# Patient Record
Sex: MALE | Race: WHITE | NOT HISPANIC OR LATINO | Employment: OTHER | URBAN - NONMETROPOLITAN AREA
[De-identification: names, ages, dates, MRNs, and addresses within clinical notes are randomized per-mention and may not be internally consistent; named-entity substitution may affect disease eponyms.]

---

## 2021-02-22 ENCOUNTER — IMMUNIZATION (OUTPATIENT)
Dept: VACCINE CLINIC | Facility: HOSPITAL | Age: 81
End: 2021-02-22

## 2021-02-22 PROCEDURE — 91300 HC SARSCOV02 VAC 30MCG/0.3ML IM: CPT | Performed by: INTERNAL MEDICINE

## 2021-02-22 PROCEDURE — 0001A: CPT | Performed by: INTERNAL MEDICINE

## 2021-03-22 ENCOUNTER — IMMUNIZATION (OUTPATIENT)
Dept: VACCINE CLINIC | Facility: HOSPITAL | Age: 81
End: 2021-03-22

## 2021-03-22 PROCEDURE — 0002A: CPT | Performed by: INTERNAL MEDICINE

## 2021-03-22 PROCEDURE — 91300 HC SARSCOV02 VAC 30MCG/0.3ML IM: CPT | Performed by: INTERNAL MEDICINE

## 2023-10-24 ENCOUNTER — OFFICE VISIT (OUTPATIENT)
Dept: CARDIOLOGY | Facility: CLINIC | Age: 83
End: 2023-10-24
Payer: COMMERCIAL

## 2023-10-24 VITALS
HEART RATE: 48 BPM | SYSTOLIC BLOOD PRESSURE: 140 MMHG | HEIGHT: 70 IN | WEIGHT: 158 LBS | BODY MASS INDEX: 22.62 KG/M2 | DIASTOLIC BLOOD PRESSURE: 80 MMHG

## 2023-10-24 DIAGNOSIS — I10 PRIMARY HYPERTENSION: ICD-10-CM

## 2023-10-24 DIAGNOSIS — T50.905A BRADYCARDIA, DRUG INDUCED: ICD-10-CM

## 2023-10-24 DIAGNOSIS — R06.02 SHORTNESS OF BREATH: Primary | ICD-10-CM

## 2023-10-24 DIAGNOSIS — E78.00 HYPERCHOLESTEROLEMIA: ICD-10-CM

## 2023-10-24 DIAGNOSIS — I25.6 SILENT MYOCARDIAL ISCHEMIA: ICD-10-CM

## 2023-10-24 DIAGNOSIS — R25.1 TREMORS OF NERVOUS SYSTEM: ICD-10-CM

## 2023-10-24 DIAGNOSIS — R42 DIZZINESS: ICD-10-CM

## 2023-10-24 DIAGNOSIS — R00.1 BRADYCARDIA, DRUG INDUCED: ICD-10-CM

## 2023-10-24 PROCEDURE — 93000 ELECTROCARDIOGRAM COMPLETE: CPT | Performed by: INTERNAL MEDICINE

## 2023-10-24 PROCEDURE — 99204 OFFICE O/P NEW MOD 45 MIN: CPT | Performed by: INTERNAL MEDICINE

## 2023-10-24 RX ORDER — PRAVASTATIN SODIUM 20 MG
20 TABLET ORAL DAILY
COMMUNITY
Start: 2023-10-04

## 2023-10-24 RX ORDER — ASPIRIN 81 MG/1
81 TABLET ORAL DAILY
Qty: 30 TABLET | Refills: 6 | Status: SHIPPED | OUTPATIENT
Start: 2023-10-24

## 2023-10-24 RX ORDER — PROPRANOLOL HYDROCHLORIDE 40 MG/1
40 TABLET ORAL 3 TIMES DAILY
Status: SHIPPED | COMMUNITY
Start: 2023-10-24

## 2023-10-24 RX ORDER — MULTIPLE VITAMINS W/ MINERALS TAB 9MG-400MCG
1 TAB ORAL DAILY
COMMUNITY

## 2023-10-24 RX ORDER — PROPRANOLOL HYDROCHLORIDE 40 MG/1
40 TABLET ORAL 3 TIMES DAILY
COMMUNITY
End: 2023-10-24

## 2023-10-24 RX ORDER — LOSARTAN POTASSIUM 50 MG/1
50 TABLET ORAL DAILY
Qty: 90 TABLET | Refills: 3 | Status: SHIPPED | OUTPATIENT
Start: 2023-10-24

## 2023-10-24 RX ORDER — LOSARTAN POTASSIUM 25 MG/1
25 TABLET ORAL DAILY
COMMUNITY
Start: 2023-08-12 | End: 2023-10-24

## 2023-10-24 RX ORDER — DOXAZOSIN MESYLATE 4 MG/1
4 TABLET ORAL NIGHTLY
COMMUNITY
Start: 2023-09-28

## 2023-10-24 RX ORDER — TADALAFIL 20 MG/1
20 TABLET ORAL
COMMUNITY

## 2023-10-24 NOTE — PROGRESS NOTES
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 Had Covid about a year ago, symptoms got worse after that.    • Labs     PCP checked in Dec, requesting results.    • Dizziness     Recently has been having some dizziness, worse when he gets up or when he is in the shower. Denies any syncope.    • Cardiac history     none        CARDIAC COMPLAINTS  dyspnea and Dizziness      Subjective   Yusuf Crawford is a 83 y.o. male came in today for his initial cardiac evaluation.  He has history of hypertension, essential tremor and hypercholesterolemia.  He apparently has been having shortness of breath on exertion.  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  He is not able to walk even a short distance without getting short winded.  He denies having any chest pain associated with that.  He denies having any palpitation.  He had COVID about a year ago and the symptoms apparently got worse since then.  He also has been noticing dizziness this happens mostly when he gets up or when he is in the shower.  He never had any syncopal episode.  Regarding his tremors he was put on moderate amount of Inderal and has been taking it since it was started.  He did undergo lab work and found to have a mild anemia with a hemoglobin of 13 and crit of 39.  His electrolytes were normal.  His cholesterol was normal at 168 with the LDL of 81.  I do not have his TSH level.  He has no history of smoking or drinking alcohol.  His father had MI.  Hypertension does run in the family.    History reviewed. No pertinent surgical history.    Current Outpatient  Medications   Medication Sig Dispense Refill   • Calcium Carbonate-Vit D-Min (CALCIUM 1200 PO) Take  by mouth.     • doxazosin (CARDURA) 4 MG tablet Take 1 tablet by mouth Every Night.     • enzalutamide (Xtandi) 40 MG chemo capsule Take 4 capsules by mouth Daily.     • multivitamin with minerals tablet tablet Take 1 tablet by mouth Daily.     • pravastatin (PRAVACHOL) 20 MG tablet Take 1 tablet by mouth Daily.     • propranolol (INDERAL) 40 MG tablet Take 1 tablet by mouth 3 (Three) Times a Day. Reduce to 1/2 tab BID     • tadalafil (ADCIRCA) 20 MG tablet tablet Take 1 tablet by mouth Daily.     • aspirin 81 MG EC tablet Take 1 tablet by mouth Daily. 30 tablet 6   • losartan (Cozaar) 50 MG tablet Take 1 tablet by mouth Daily. 90 tablet 3     No current facility-administered medications for this visit.           ALLERGIES:  Patient has no known allergies.    Past Medical History:   Diagnosis Date   • Colon cancer    • History of elbow surgery    • History of partial colectomy     2008   • History of transurethral resection of prostate    • Hyperlipidemia    • Hypertension    • Prostate cancer        Social History     Tobacco Use   Smoking Status Never   Smokeless Tobacco Never          Family History   Problem Relation Age of Onset   • Cancer Mother    • Dementia Father    • Heart attack Father         Multiple MI's in his 50's   • Dementia Brother    • Other Other         medical history unknown   • Hypertension Daughter    • Hypertension Daughter    • Hypertension Son    • Hypertension Son        Review of Systems   Constitutional: Positive for malaise/fatigue. Negative for decreased appetite.   HENT:  Negative for congestion and sore throat.    Eyes:  Negative for blurred vision, double vision and visual disturbance.   Cardiovascular:  Positive for dyspnea on exertion. Negative for chest pain.   Respiratory:  Positive for shortness of breath. Negative for snoring.    Endocrine: Negative for cold intolerance and  "heat intolerance.   Hematologic/Lymphatic: Negative for adenopathy. Does not bruise/bleed easily.   Skin:  Negative for itching, nail changes and skin cancer.   Musculoskeletal:  Negative for arthritis and myalgias.   Gastrointestinal:  Negative for abdominal pain, dysphagia and heartburn.   Genitourinary:  Negative for bladder incontinence and frequency.   Neurological:  Negative for dizziness, seizures and vertigo.   Psychiatric/Behavioral:  Negative for altered mental status.    Allergic/Immunologic: Negative for environmental allergies and hives.     Diabetes- No  Thyroid- normal    Objective     /80 (BP Location: Left arm)   Pulse (!) 48   Ht 177.8 cm (70\")   Wt 71.7 kg (158 lb)   BMI 22.67 kg/m²     Vitals and nursing note reviewed.   Constitutional:       Appearance: Healthy appearance. Not in distress.   Eyes:      Conjunctiva/sclera: Conjunctivae normal.      Pupils: Pupils are equal, round, and reactive to light.   HENT:      Head: Normocephalic.   Pulmonary:      Effort: Pulmonary effort is normal.      Breath sounds: Normal breath sounds.   Cardiovascular:      PMI at left midclavicular line. Bradycardia present. Regular rhythm.      Murmurs: There is a grade 3/6 high frequency blowing holosystolic murmur at the apex.   Abdominal:      General: Bowel sounds are normal.      Palpations: Abdomen is soft.   Musculoskeletal: Normal range of motion.      Cervical back: Normal range of motion and neck supple. Skin:     General: Skin is warm and dry.   Neurological:      Mental Status: Alert, oriented to person, place, and time and oriented to person, place and time.       ECG 12 Lead    Date/Time: 10/24/2023 1:11 PM  Performed by: Rossy Duff MD    Authorized by: Rossy Duff MD  Comparison: compared with previous ECG from 6/6/2022  Similar to previous ECG  Rhythm: sinus bradycardia  Rate: bradycardic  QRS axis: normal  Other findings: non-specific ST-T wave changes    Clinical " impression: non-specific ECG        @ASSESSMENT/PLAN@  BMI is within normal parameters. No other follow-up for BMI required.     Diagnoses and all orders for this visit:    1. Shortness of breath (Primary)  -     Stress Test With Myocardial Perfusion One Day; Future  -     Adult Transthoracic Echo Complete W/ Cont if Necessary Per Protocol; Future    2. Bradycardia, drug induced    3. Primary hypertension  -     losartan (Cozaar) 50 MG tablet; Take 1 tablet by mouth Daily.  Dispense: 90 tablet; Refill: 3    4. Tremors of nervous system    5. Dizziness  -     Adult Transthoracic Echo Complete W/ Cont if Necessary Per Protocol; Future    6. Hypercholesterolemia    7. Silent myocardial ischemia  -     aspirin 81 MG EC tablet; Take 1 tablet by mouth Daily.  Dispense: 30 tablet; Refill: 6  -     Stress Test With Myocardial Perfusion One Day; Future    At baseline is bradycardic.  His blood pressure is slightly elevated.  His EKG showed a lot of artifact.  The first EKG was read as atrial flutter though it appears to be secondary to his tremors repeat EKG shows sinus bradycardia with nonspecific T wave changes.  His clinical examination reveals a BMI of 23.  He does have significant tremors.    Regarding his major problem of shortness of breath, it apparently started after COVID.  I talked with him about possibility of a cardiomyopathy from COVID itself.  I scheduled him to undergo an echocardiogram to evaluate for LV function, valvular structures and the PA pressure.  I also talked to him about the possibility of silent myocardial ischemia.  I did advise him to be on aspirin 81 mg once a day and schedule him to undergo a stress test in the form of Lexiscan to rule out ischemia.    Regarding his bradycardia, it could be secondary to the high-dose of Inderal he is taking.  He was on 40 mg 3 times a day.  I advised him to reduce it to 20 mg twice a day.  If the heart rate still remains low then we may need to taper and stop  the medication.  At this time he is not a candidate for pacemaker.    Regarding his hypertension which is still slightly elevated, I increased the dose of Cozaar to 50 mg once a day.  Advised him to check his blood pressure regularly.  If it persistently high then we may have to increase it to 100 mg    Regarding his tremors, apparently he was told this is essential tremor.  If he is not able to take the Inderal and if the tremors gets worse, he may need to see a neurologist to try different medication    Regarding his dizziness which could be related to bradycardia I like to rule out valvular heart disease also.  Like to evaluate his valvular structures by an echocardiogram    Regarding his hypercholesterolemia, it seems to be controlled with Pravachol itself.  Continue the same    Regarding advanced directive, he does have a living will and power of .  I talked to him about it and gave him booklets regarding that  Based on the results, further recommendations will be made                 Electronically signed by Rossy Duff MD October 24, 2023 12:58 EDT

## 2023-10-24 NOTE — LETTER
October 24, 2023       No Recipients    Patient: Yusuf Crawford   YOB: 1940   Date of Visit: 10/24/2023     Dear Yoandy You DO:       Thank you for referring Yusuf Crawford to me for evaluation. Below are the relevant portions of my assessment and plan of care.    If you have questions, please do not hesitate to call me. I look forward to following Yusuf along with you.         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 Had Covid about a year ago, symptoms got worse after that.    • Labs     PCP checked in Dec, requesting results.    • Dizziness     Recently has been having some dizziness, worse when he gets up or when he is in the shower. Denies any syncope.    • Cardiac history     none        CARDIAC COMPLAINTS  dyspnea and Dizziness      Subjective  Yusuf Crawford is a 83 y.o. male came in today for his initial cardiac evaluation.  He has history of hypertension, essential tremor and hypercholesterolemia.  He apparently has been having shortness of breath on exertion.  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  He is not able to walk even a short distance without getting short winded.  He denies having any chest pain associated with that.  He denies having any palpitation.  He had COVID about a year ago and the symptoms apparently got worse since then.  He also has been noticing dizziness this happens mostly when he gets up or when he is in the shower.  He  never had any syncopal episode.  Regarding his tremors he was put on moderate amount of Inderal and has been taking it since it was started.  He did undergo lab work and found to have a mild anemia with a hemoglobin of 13 and crit of 39.  His electrolytes were normal.  His cholesterol was normal at 168 with the LDL of 81.  I do not have his TSH level.  He has no history of smoking or drinking alcohol.  His father had MI.  Hypertension does run in the family.    History reviewed. No pertinent surgical history.    Current Outpatient Medications   Medication Sig Dispense Refill   • Calcium Carbonate-Vit D-Min (CALCIUM 1200 PO) Take  by mouth.     • doxazosin (CARDURA) 4 MG tablet Take 1 tablet by mouth Every Night.     • enzalutamide (Xtandi) 40 MG chemo capsule Take 4 capsules by mouth Daily.     • multivitamin with minerals tablet tablet Take 1 tablet by mouth Daily.     • pravastatin (PRAVACHOL) 20 MG tablet Take 1 tablet by mouth Daily.     • propranolol (INDERAL) 40 MG tablet Take 1 tablet by mouth 3 (Three) Times a Day. Reduce to 1/2 tab BID     • tadalafil (ADCIRCA) 20 MG tablet tablet Take 1 tablet by mouth Daily.     • aspirin 81 MG EC tablet Take 1 tablet by mouth Daily. 30 tablet 6   • losartan (Cozaar) 50 MG tablet Take 1 tablet by mouth Daily. 90 tablet 3     No current facility-administered medications for this visit.           ALLERGIES:  Patient has no known allergies.    Past Medical History:   Diagnosis Date   • Colon cancer    • History of elbow surgery    • History of partial colectomy     2008   • History of transurethral resection of prostate    • Hyperlipidemia    • Hypertension    • Prostate cancer        Social History     Tobacco Use   Smoking Status Never   Smokeless Tobacco Never          Family History   Problem Relation Age of Onset   • Cancer Mother    • Dementia Father    • Heart attack Father         Multiple MI's in his 50's   • Dementia Brother    • Other Other         medical history  "unknown   • Hypertension Daughter    • Hypertension Daughter    • Hypertension Son    • Hypertension Son        Review of Systems   Constitutional: Positive for malaise/fatigue. Negative for decreased appetite.   HENT:  Negative for congestion and sore throat.    Eyes:  Negative for blurred vision, double vision and visual disturbance.   Cardiovascular:  Positive for dyspnea on exertion. Negative for chest pain.   Respiratory:  Positive for shortness of breath. Negative for snoring.    Endocrine: Negative for cold intolerance and heat intolerance.   Hematologic/Lymphatic: Negative for adenopathy. Does not bruise/bleed easily.   Skin:  Negative for itching, nail changes and skin cancer.   Musculoskeletal:  Negative for arthritis and myalgias.   Gastrointestinal:  Negative for abdominal pain, dysphagia and heartburn.   Genitourinary:  Negative for bladder incontinence and frequency.   Neurological:  Negative for dizziness, seizures and vertigo.   Psychiatric/Behavioral:  Negative for altered mental status.    Allergic/Immunologic: Negative for environmental allergies and hives.     Diabetes- No  Thyroid- normal    Objective    /80 (BP Location: Left arm)   Pulse (!) 48   Ht 177.8 cm (70\")   Wt 71.7 kg (158 lb)   BMI 22.67 kg/m²     Vitals and nursing note reviewed.   Constitutional:       Appearance: Healthy appearance. Not in distress.   Eyes:      Conjunctiva/sclera: Conjunctivae normal.      Pupils: Pupils are equal, round, and reactive to light.   HENT:      Head: Normocephalic.   Pulmonary:      Effort: Pulmonary effort is normal.      Breath sounds: Normal breath sounds.   Cardiovascular:      PMI at left midclavicular line. Bradycardia present. Regular rhythm.      Murmurs: There is a grade 3/6 high frequency blowing holosystolic murmur at the apex.   Abdominal:      General: Bowel sounds are normal.      Palpations: Abdomen is soft.   Musculoskeletal: Normal range of motion.      Cervical back: " Normal range of motion and neck supple. Skin:     General: Skin is warm and dry.   Neurological:      Mental Status: Alert, oriented to person, place, and time and oriented to person, place and time.       ECG 12 Lead    Date/Time: 10/24/2023 1:11 PM  Performed by: Rossy Duff MD    Authorized by: Rossy Duff MD  Comparison: compared with previous ECG from 6/6/2022  Similar to previous ECG  Rhythm: sinus bradycardia  Rate: bradycardic  QRS axis: normal  Other findings: non-specific ST-T wave changes    Clinical impression: non-specific ECG        @ASSESSMENT/PLAN@  BMI is within normal parameters. No other follow-up for BMI required.     Diagnoses and all orders for this visit:    1. Shortness of breath (Primary)  -     Stress Test With Myocardial Perfusion One Day; Future  -     Adult Transthoracic Echo Complete W/ Cont if Necessary Per Protocol; Future    2. Bradycardia, drug induced    3. Primary hypertension  -     losartan (Cozaar) 50 MG tablet; Take 1 tablet by mouth Daily.  Dispense: 90 tablet; Refill: 3    4. Tremors of nervous system    5. Dizziness  -     Adult Transthoracic Echo Complete W/ Cont if Necessary Per Protocol; Future    6. Hypercholesterolemia    7. Silent myocardial ischemia  -     aspirin 81 MG EC tablet; Take 1 tablet by mouth Daily.  Dispense: 30 tablet; Refill: 6  -     Stress Test With Myocardial Perfusion One Day; Future    At baseline is bradycardic.  His blood pressure is slightly elevated.  His EKG showed a lot of artifact.  The first EKG was read as atrial flutter though it appears to be secondary to his tremors repeat EKG shows sinus bradycardia with nonspecific T wave changes.  His clinical examination reveals a BMI of 23.  He does have significant tremors.    Regarding his major problem of shortness of breath, it apparently started after COVID.  I talked with him about possibility of a cardiomyopathy from COVID itself.  I scheduled him to undergo an  echocardiogram to evaluate for LV function, valvular structures and the PA pressure.  I also talked to him about the possibility of silent myocardial ischemia.  I did advise him to be on aspirin 81 mg once a day and schedule him to undergo a stress test in the form of Lexiscan to rule out ischemia.    Regarding his bradycardia, it could be secondary to the high-dose of Inderal he is taking.  He was on 40 mg 3 times a day.  I advised him to reduce it to 20 mg twice a day.  If the heart rate still remains low then we may need to taper and stop the medication.  At this time he is not a candidate for pacemaker.    Regarding his hypertension which is still slightly elevated, I increased the dose of Cozaar to 50 mg once a day.  Advised him to check his blood pressure regularly.  If it persistently high then we may have to increase it to 100 mg    Regarding his tremors, apparently he was told this is essential tremor.  If he is not able to take the Inderal and if the tremors gets worse, he may need to see a neurologist to try different medication    Regarding his dizziness which could be related to bradycardia I like to rule out valvular heart disease also.  Like to evaluate his valvular structures by an echocardiogram    Regarding his hypercholesterolemia, it seems to be controlled with Pravachol itself.  Continue the same    Regarding advanced directive, he does have a living will and power of .  I talked to him about it and gave him booklets regarding that  Based on the results, further recommendations will be made                 Electronically signed by Rossy Duff MD October 24, 2023 12:58 EDT

## 2023-11-06 ENCOUNTER — HOSPITAL ENCOUNTER (OUTPATIENT)
Dept: CARDIOLOGY | Facility: HOSPITAL | Age: 83
Discharge: HOME | End: 2023-11-06
Payer: MEDICARE

## 2023-11-06 VITALS — HEIGHT: 70 IN | BODY MASS INDEX: 22.63 KG/M2 | WEIGHT: 158.07 LBS

## 2023-11-06 DIAGNOSIS — R42 DIZZINESS: ICD-10-CM

## 2023-11-06 DIAGNOSIS — R06.02 SHORTNESS OF BREATH: ICD-10-CM

## 2023-11-06 DIAGNOSIS — I25.6 SILENT MYOCARDIAL ISCHEMIA: ICD-10-CM

## 2023-11-06 LAB
AORTIC DIMENSIONLESS INDEX: 0.91 (DI)
BH CV ECHO MEAS - ACS: 1.43 CM
BH CV ECHO MEAS - AO MAX PG: 4.1 MMHG
BH CV ECHO MEAS - AO MEAN PG: 2.25 MMHG
BH CV ECHO MEAS - AO ROOT DIAM: 3 CM
BH CV ECHO MEAS - AO V2 MAX: 101.4 CM/SEC
BH CV ECHO MEAS - AO V2 VTI: 25.8 CM
BH CV ECHO MEAS - EDV(CUBED): 129.6 ML
BH CV ECHO MEAS - EF(MOD-BP): 58 %
BH CV ECHO MEAS - ESV(CUBED): 42.6 ML
BH CV ECHO MEAS - FS: 31 %
BH CV ECHO MEAS - IVS/LVPW: 1.02 CM
BH CV ECHO MEAS - IVSD: 0.99 CM
BH CV ECHO MEAS - LA DIMENSION: 3.8 CM
BH CV ECHO MEAS - LAT PEAK E' VEL: 7 CM/SEC
BH CV ECHO MEAS - LV MASS(C)D: 181.1 GRAMS
BH CV ECHO MEAS - LV MAX PG: 3.4 MMHG
BH CV ECHO MEAS - LV MEAN PG: 1.64 MMHG
BH CV ECHO MEAS - LV V1 MAX: 92.7 CM/SEC
BH CV ECHO MEAS - LV V1 VTI: 26.3 CM
BH CV ECHO MEAS - LVIDD: 5.1 CM
BH CV ECHO MEAS - LVIDS: 3.5 CM
BH CV ECHO MEAS - LVPWD: 0.97 CM
BH CV ECHO MEAS - MED PEAK E' VEL: 8.4 CM/SEC
BH CV ECHO MEAS - MV A MAX VEL: 53.4 CM/SEC
BH CV ECHO MEAS - MV DEC SLOPE: 273.6 CM/SEC2
BH CV ECHO MEAS - MV DEC TIME: 0.35 SEC
BH CV ECHO MEAS - MV E MAX VEL: 47.9 CM/SEC
BH CV ECHO MEAS - MV E/A: 0.9
BH CV ECHO MEAS - MV MAX PG: 1.78 MMHG
BH CV ECHO MEAS - MV MEAN PG: 0.69 MMHG
BH CV ECHO MEAS - MV P1/2T: 81.7 MSEC
BH CV ECHO MEAS - MV V2 VTI: 25 CM
BH CV ECHO MEAS - MVA(P1/2T): 2.7 CM2
BH CV ECHO MEAS - PA V2 MAX: 92.7 CM/SEC
BH CV ECHO MEAS - RAP SYSTOLE: 8 MMHG
BH CV ECHO MEAS - RV MAX PG: 1.87 MMHG
BH CV ECHO MEAS - RV V1 MAX: 68.3 CM/SEC
BH CV ECHO MEAS - RV V1 VTI: 19.7 CM
BH CV ECHO MEAS - RVSP: 26.4 MMHG
BH CV ECHO MEAS - TAPSE (>1.6): 2.5 CM
BH CV ECHO MEAS - TR MAX PG: 18.4 MMHG
BH CV ECHO MEAS - TR MAX VEL: 214.5 CM/SEC
BH CV ECHO MEASUREMENTS AVERAGE E/E' RATIO: 6.22
BH CV REST NUCLEAR ISOTOPE DOSE: 10 MCI
BH CV STRESS COMMENTS STAGE 1: NORMAL
BH CV STRESS DOSE REGADENOSON STAGE 1: 0.4
BH CV STRESS DURATION MIN STAGE 1: 0
BH CV STRESS DURATION SEC STAGE 1: 10
BH CV STRESS NUCLEAR ISOTOPE DOSE: 30 MCI
BH CV STRESS PROTOCOL 1: NORMAL
BH CV STRESS RECOVERY HR: 65 BPM
BH CV STRESS STAGE 1: 1
BH CV XLRA - TDI S': 12.9 CM/SEC
LV EF NUC BP: 58 %
MAXIMAL PREDICTED HEART RATE: 136 BPM
PERCENT MAX PREDICTED HR: 49.26 %
SINUS: 2.9 CM
STRESS BASELINE BP: NORMAL MMHG
STRESS BASELINE HR: 59 BPM
STRESS PERCENT HR: 58 %
STRESS POST PEAK BP: NORMAL MMHG
STRESS POST PEAK HR: 67 BPM
STRESS TARGET HR: 116 BPM

## 2023-11-06 PROCEDURE — 78452 HT MUSCLE IMAGE SPECT MULT: CPT

## 2023-11-06 PROCEDURE — 93306 TTE W/DOPPLER COMPLETE: CPT

## 2023-11-06 PROCEDURE — 93306 TTE W/DOPPLER COMPLETE: CPT | Performed by: INTERNAL MEDICINE

## 2023-11-06 PROCEDURE — A9500 TC99M SESTAMIBI: HCPCS | Performed by: INTERNAL MEDICINE

## 2023-11-06 PROCEDURE — 0 TECHNETIUM SESTAMIBI: Performed by: INTERNAL MEDICINE

## 2023-11-06 PROCEDURE — 93018 CV STRESS TEST I&R ONLY: CPT | Performed by: INTERNAL MEDICINE

## 2023-11-06 PROCEDURE — 78452 HT MUSCLE IMAGE SPECT MULT: CPT | Performed by: INTERNAL MEDICINE

## 2023-11-06 PROCEDURE — 93017 CV STRESS TEST TRACING ONLY: CPT

## 2023-11-06 PROCEDURE — 25010000002 REGADENOSON 0.4 MG/5ML SOLUTION: Performed by: INTERNAL MEDICINE

## 2023-11-06 RX ORDER — REGADENOSON 0.08 MG/ML
0.4 INJECTION, SOLUTION INTRAVENOUS
Status: COMPLETED | OUTPATIENT
Start: 2023-11-06 | End: 2023-11-06

## 2023-11-06 RX ADMIN — REGADENOSON 0.4 MG: 0.08 INJECTION, SOLUTION INTRAVENOUS at 13:32

## 2023-11-06 RX ADMIN — TECHNETIUM TC 99M SESTAMIBI 1 DOSE: 1 INJECTION INTRAVENOUS at 13:32

## 2023-11-06 RX ADMIN — TECHNETIUM TC 99M SESTAMIBI 1 DOSE: 1 INJECTION INTRAVENOUS at 12:34

## 2023-11-08 ENCOUNTER — TELEPHONE (OUTPATIENT)
Dept: CARDIOLOGY | Facility: CLINIC | Age: 83
End: 2023-11-08
Payer: MEDICARE

## 2023-11-08 RX ORDER — LOSARTAN POTASSIUM AND HYDROCHLOROTHIAZIDE 25; 100 MG/1; MG/1
1 TABLET ORAL DAILY
Qty: 90 TABLET | Refills: 3 | Status: SHIPPED | OUTPATIENT
Start: 2023-11-08

## 2023-11-08 NOTE — TELEPHONE ENCOUNTER
I spoke with patient and wife, Cristopher.  Aware of stress test results and recommendations to stop losartan and start Hyzaar 100/25 mg once a day and if symptoms persist to call our office may need elective cath for definite diagnosis.  Patient and wife verbalized understanding.

## 2023-11-08 NOTE — TELEPHONE ENCOUNTER
----- Message from Rossy Duff MD sent at 11/7/2023  6:48 AM EST -----  Cozaar to Hyzaar  Cath if he has more symptoms

## 2023-11-16 ENCOUNTER — TELEPHONE (OUTPATIENT)
Dept: CARDIOLOGY | Facility: CLINIC | Age: 83
End: 2023-11-16
Payer: MEDICARE

## 2023-11-16 NOTE — TELEPHONE ENCOUNTER
Patient and his daughter Kaylee called regarding patient having problems with dizzy spells.  He says he notices more after he has exerts himself and sits down to rest and then when he gets up he has the dizzy spells.  He checked BP this afternoon and reports it was 136/69 HR 80's.  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  He drinks 2- 16 oz bottles of water a day.  He reports he has not had any low BP readings that he is aware of.  After stress test his losartan 50 mg was changed to Hyzaar 100/25 mg daily. He is also taking his propranolol 40 mg 1/2 tablet BID.  He denies any chest pain or palpitations. He said his SOA is when he does moderate exertion, but states that is his normal.

## 2023-11-16 NOTE — TELEPHONE ENCOUNTER
Patient denies any palpitation or profuse diaphoresis with dizziness.  Patient's wife asked if his Xtandi 40 mg 4 caps daily could be causing problems with his other medications.  I advised I am not sure, but she said he has been on it for about 1 1/2 years..

## 2023-11-16 NOTE — TELEPHONE ENCOUNTER
Any palpitation associated with the dizziness?  He may need a Holter if he has palpitation also  If he also has profuse diaphoresis with the dizziness then he may need to undergo cardiac catheterization for silent ischemia

## 2024-02-20 RX ORDER — LOSARTAN POTASSIUM AND HYDROCHLOROTHIAZIDE 25; 100 MG/1; MG/1
1 TABLET ORAL DAILY
Qty: 90 TABLET | Refills: 3 | Status: SHIPPED | OUTPATIENT
Start: 2024-02-20

## 2024-02-20 NOTE — TELEPHONE ENCOUNTER
Caller: [facility] [place] - [place], [place] - [address] - [phone] [facility] [phone] FX    Relationship: Pharmacy    Requested Prescriptions:   Requested Prescriptions     Pending Prescriptions Disp Refills    losartan-hydrochlorothiazide (Hyzaar) 100-25 MG per tablet 90 tablet 3     Sig: Take 1 tablet by mouth Daily. STOP LOSARTAN        Pharmacy where request should be sent: [facility] [place] - [place] - [address] - [phone] [facility] [phone] FX     Last office visit with prescribing clinician: 10/24/2023   Last telemedicine visit with prescribing clinician: Visit date not found   Next office visit with prescribing clinician: Visit date not found       Dirk Pavon Rep   02/20/24 13:51 EST

## 2024-05-01 ENCOUNTER — OFFICE VISIT (OUTPATIENT)
Dept: CARDIOLOGY | Facility: CLINIC | Age: 84
End: 2024-05-01
Payer: MEDICARE

## 2024-05-01 VITALS
WEIGHT: 145.4 LBS | SYSTOLIC BLOOD PRESSURE: 118 MMHG | HEART RATE: 64 BPM | BODY MASS INDEX: 20.81 KG/M2 | DIASTOLIC BLOOD PRESSURE: 60 MMHG | HEIGHT: 70 IN

## 2024-05-01 DIAGNOSIS — E78.00 HYPERCHOLESTEROLEMIA: ICD-10-CM

## 2024-05-01 DIAGNOSIS — I10 PRIMARY HYPERTENSION: ICD-10-CM

## 2024-05-01 DIAGNOSIS — R94.39 ABNORMAL NUCLEAR STRESS TEST: Primary | ICD-10-CM

## 2024-05-01 DIAGNOSIS — R25.1 TREMORS OF NERVOUS SYSTEM: ICD-10-CM

## 2024-05-01 DIAGNOSIS — R42 DIZZINESS: ICD-10-CM

## 2024-05-01 DIAGNOSIS — R06.02 SHORTNESS OF BREATH: ICD-10-CM

## 2024-05-01 PROCEDURE — 1159F MED LIST DOCD IN RCRD: CPT | Performed by: NURSE PRACTITIONER

## 2024-05-01 PROCEDURE — 3074F SYST BP LT 130 MM HG: CPT | Performed by: NURSE PRACTITIONER

## 2024-05-01 PROCEDURE — 1160F RVW MEDS BY RX/DR IN RCRD: CPT | Performed by: NURSE PRACTITIONER

## 2024-05-01 PROCEDURE — 3078F DIAST BP <80 MM HG: CPT | Performed by: NURSE PRACTITIONER

## 2024-05-01 PROCEDURE — 99214 OFFICE O/P EST MOD 30 MIN: CPT | Performed by: NURSE PRACTITIONER

## 2024-05-01 RX ORDER — OMEPRAZOLE 20 MG/1
20 CAPSULE, DELAYED RELEASE ORAL 2 TIMES DAILY
COMMUNITY

## 2024-05-01 RX ORDER — LOSARTAN POTASSIUM 50 MG/1
50 TABLET ORAL DAILY
Qty: 90 TABLET | Refills: 2 | Status: SHIPPED | OUTPATIENT
Start: 2024-05-01

## 2024-05-01 RX ORDER — CLONAZEPAM 0.5 MG/1
0.5 TABLET ORAL 2 TIMES DAILY PRN
COMMUNITY

## 2024-05-01 RX ORDER — PROPRANOLOL HYDROCHLORIDE 20 MG/1
20 TABLET ORAL 2 TIMES DAILY
Qty: 180 TABLET | Refills: 2 | Status: SHIPPED | OUTPATIENT
Start: 2024-05-01

## 2024-05-01 RX ORDER — PROPRANOLOL HYDROCHLORIDE 20 MG/1
20 TABLET ORAL 2 TIMES DAILY
Qty: 180 TABLET | Refills: 2 | Status: SHIPPED | OUTPATIENT
Start: 2024-05-01 | End: 2024-05-01 | Stop reason: SDUPTHER

## 2024-05-01 NOTE — PROGRESS NOTES
Chief Complaint   Patient presents with    Follow-up     Cardiac management    Dizziness     Reports he has episodes of dizziness, is worse  when he gets up in morning.  He is trying to hydrate and eat  better  . He had a couple falls due to dizziness .  Reports he gets fatigued very quickly , any exertion exhausts him     Hypotension     Has orthostatic hypotension at times.     Med Refill     No refills needed today       Subjective       Yusuf Crawford is a 84 y.o. male initially seen October 2023 for cardiac consultation.  He has HTN, essential tremor, and hypercholesterolemia.  His symptoms included shortness of breath.  Symptoms worsen after having COVID.    On 11/6/2023 Lexiscan stress test showed increased BP response and ischemia versus versus diaphragmatic attenuation.  Cozaar changed to Hyzaar.  Plan to proceed with cardiac catheterization for definitive doses if symptoms persisted.  Echocardiogram showed borderline LVH with EF around 60% and no significant valve issues.    Today returns the office for follow-up visit.  He continues to have decreased appetite but is maintaining weight.  He is trying to maintain better water intake but continues to have some episodes of lightheadedness, near syncope or syncopal episodes denied.  He denies chest pain, palpitations, edema or increase shortness of breath.       Cardiac History:    Past Surgical History:   Procedure Laterality Date    CARDIOVASCULAR STRESS TEST  11/06/2023    Lexiscan. EF 58%. 202/92. PVC. R/O Inferoseptal Ischemia    ECHO - CONVERTED  11/06/2023    EF 60%. LA- 3.8. Mild MR. RVSP- 27 mmHg       Current Outpatient Medications   Medication Sig Dispense Refill    aspirin 81 MG EC tablet Take 1 tablet by mouth Daily. 30 tablet 6    Calcium Carbonate-Vit D-Min (CALCIUM 1200 PO) Take  by mouth.      clonazePAM (KlonoPIN) 0.5 MG tablet Take 1 tablet by mouth 2 (Two) Times a Day As Needed. 1/2 tablet twice daily      doxazosin (CARDURA) 4 MG tablet  Take 1 tablet by mouth Every Night.      enzalutamide (Xtandi) 40 MG chemo capsule Take 4 capsules by mouth Daily.      multivitamin with minerals tablet tablet Take 1 tablet by mouth Daily.      omeprazole (priLOSEC) 20 MG capsule Take 1 capsule by mouth 2 (Two) Times a Day.      pravastatin (PRAVACHOL) 20 MG tablet Take 1 tablet by mouth Daily.      propranolol (INDERAL) 20 MG tablet Take 1 tablet by mouth 2 (Two) Times a Day. 180 tablet 2    losartan (Cozaar) 50 MG tablet Take 1 tablet by mouth Daily. 90 tablet 2     No current facility-administered medications for this visit.       Patient has no known allergies.    Past Medical History:   Diagnosis Date    Colon cancer     History of elbow surgery     History of partial colectomy     2008    History of transurethral resection of prostate     Hyperlipidemia     Hypertension     Prostate cancer        Social History     Socioeconomic History    Marital status:    Tobacco Use    Smoking status: Never    Smokeless tobacco: Never   Vaping Use    Vaping status: Never Used   Substance and Sexual Activity    Alcohol use: Never    Drug use: Never       Family History   Problem Relation Age of Onset    Cancer Mother     Dementia Father     Heart attack Father         Multiple MI's in his 50's    Dementia Brother     Other Other         medical history unknown    Hypertension Daughter     Hypertension Daughter     Hypertension Son     Hypertension Son        Review of Systems   Constitutional: Positive for decreased appetite. Negative for weight gain and weight loss.   Cardiovascular:  Negative for chest pain, leg swelling, near-syncope and palpitations.   Respiratory:  Negative for shortness of breath.    Hematologic/Lymphatic: Negative for bleeding problem.   Neurological:  Positive for dizziness and light-headedness.        BP Readings from Last 5 Encounters:   05/01/24 118/60   10/24/23 140/80       Wt Readings from Last 5 Encounters:   05/01/24 66 kg (145 lb  "6.4 oz)   11/06/23 71.7 kg (158 lb 1.1 oz)   10/24/23 71.7 kg (158 lb)       Objective     /60 (BP Location: Left arm, Patient Position: Standing)   Pulse 64   Ht 177.8 cm (70\")   Wt 66 kg (145 lb 6.4 oz)   BMI 20.86 kg/m²     Vitals and nursing note reviewed.   Constitutional:       Appearance: Healthy appearance. Not in distress.   Eyes:      Conjunctiva/sclera: Conjunctivae normal.      Pupils: Pupils are equal, round, and reactive to light.   HENT:      Head: Normocephalic.   Pulmonary:      Effort: Pulmonary effort is normal.      Breath sounds: Normal breath sounds.   Cardiovascular:      PMI at left midclavicular line. Normal rate. Regular rhythm.   Pulses:     Intact distal pulses.   Edema:     Peripheral edema absent.   Abdominal:      General: Bowel sounds are normal.      Palpations: Abdomen is soft.   Musculoskeletal: Normal range of motion.      Cervical back: Normal range of motion and neck supple. Skin:     General: Skin is warm and dry.   Neurological:      Mental Status: Alert, oriented to person, place, and time and oriented to person, place and time.          Procedures: none today          Assessment & Plan   Diagnoses and all orders for this visit:    1. Abnormal nuclear stress test (Primary)    2. Primary hypertension  -     losartan (Cozaar) 50 MG tablet; Take 1 tablet by mouth Daily.  Dispense: 90 tablet; Refill: 2    3. Hypercholesterolemia    4. Shortness of breath    5. Dizziness  -     US Carotid Bilateral; Future    6. Tremors of nervous system  -     propranolol (INDERAL) 20 MG tablet; Take 1 tablet by mouth 2 (Two) Times a Day.  Dispense: 180 tablet; Refill: 2    Other orders  -     Discontinue: propranolol (INDERAL) 20 MG tablet; Take 1 tablet by mouth 2 (Two) Times a Day.  Dispense: 180 tablet; Refill: 2      Abnormal nuclear stress test  - 11/6/2023 Lexiscan stress test: Diaphragmatic attenuation versus small inferior septal ischemia.  -Echocardiogram 11/6/2023: Borderline " LVH with a EF around 60%, mild MR, RVSP 27 mmHg  -Denies anginal symptoms except dizziness  -Continue statin, aspirin    Dizziness  -Most likely side effect of medication  -Discussed importance of adequate hydration  -BP low normal.  We will try stopping diuretic and continue losartan at 50 mg daily  -To ensure no carotid artery stenosis as causative factor an ultrasound ordered.    Hypertension  -BP low normal  -Stop diuretic  -Continue losartan 50 mg daily  -Continue Inderal at 20 mg twice a day.  Heart rate now normal range.  Rhythm normal.  On Inderal for tremor management.    Tremors  -Followed by neurologist    Shortness of breath  -Admits to improvement    6-month follow-up visit scheduled.    Please call sooner for cardiac concerns.               Electronically signed by Kinga Solomon, MERLINE,  May 2, 2024 11:59 EDT    Dictated Utilizing Dragon Dictation: Part of this note may be an electronic transcription/translation of spoken language to printed text using the Dragon Dictation System.

## 2024-05-14 ENCOUNTER — HOSPITAL ENCOUNTER (OUTPATIENT)
Dept: CARDIOLOGY | Facility: HOSPITAL | Age: 84
Discharge: HOME | End: 2024-05-14
Admitting: NURSE PRACTITIONER
Payer: MEDICARE

## 2024-05-14 DIAGNOSIS — R42 DIZZINESS: ICD-10-CM

## 2024-05-14 PROCEDURE — 93880 EXTRACRANIAL BILAT STUDY: CPT

## 2024-05-14 PROCEDURE — 93880 EXTRACRANIAL BILAT STUDY: CPT | Performed by: RADIOLOGY

## 2024-09-13 ENCOUNTER — TELEPHONE (OUTPATIENT)
Dept: CARDIOLOGY | Facility: CLINIC | Age: 84
End: 2024-09-13
Payer: MEDICARE

## 2024-11-14 ENCOUNTER — OFFICE VISIT (OUTPATIENT)
Dept: CARDIOLOGY | Facility: CLINIC | Age: 84
End: 2024-11-14
Payer: MEDICARE

## 2024-11-14 VITALS
HEIGHT: 70 IN | SYSTOLIC BLOOD PRESSURE: 126 MMHG | BODY MASS INDEX: 20.86 KG/M2 | HEART RATE: 74 BPM | DIASTOLIC BLOOD PRESSURE: 82 MMHG

## 2024-11-14 DIAGNOSIS — R42 DIZZINESS: ICD-10-CM

## 2024-11-14 DIAGNOSIS — I10 PRIMARY HYPERTENSION: ICD-10-CM

## 2024-11-14 DIAGNOSIS — E78.00 HYPERCHOLESTEROLEMIA: ICD-10-CM

## 2024-11-14 DIAGNOSIS — R94.39 ABNORMAL NUCLEAR STRESS TEST: ICD-10-CM

## 2024-11-14 DIAGNOSIS — I10 PRIMARY HYPERTENSION: Primary | ICD-10-CM

## 2024-11-14 PROCEDURE — 3079F DIAST BP 80-89 MM HG: CPT | Performed by: NURSE PRACTITIONER

## 2024-11-14 PROCEDURE — 99213 OFFICE O/P EST LOW 20 MIN: CPT | Performed by: NURSE PRACTITIONER

## 2024-11-14 PROCEDURE — 1160F RVW MEDS BY RX/DR IN RCRD: CPT | Performed by: NURSE PRACTITIONER

## 2024-11-14 PROCEDURE — 1159F MED LIST DOCD IN RCRD: CPT | Performed by: NURSE PRACTITIONER

## 2024-11-14 PROCEDURE — 3074F SYST BP LT 130 MM HG: CPT | Performed by: NURSE PRACTITIONER

## 2024-11-14 RX ORDER — LOSARTAN POTASSIUM 25 MG/1
25 TABLET ORAL DAILY
Qty: 90 TABLET | Refills: 1 | Status: SHIPPED | OUTPATIENT
Start: 2024-11-14

## 2024-11-14 RX ORDER — LOSARTAN POTASSIUM 50 MG/1
TABLET ORAL
Qty: 90 TABLET | Refills: 0 | Status: SHIPPED | OUTPATIENT
Start: 2024-11-14 | End: 2024-11-14

## 2024-11-14 RX ORDER — UREA 10 %
1 LOTION (ML) TOPICAL EVERY OTHER DAY
COMMUNITY

## 2024-11-14 NOTE — PROGRESS NOTES
Chief Complaint   Patient presents with    Follow-up     Cardiac management . Is anticipating  cholecystectomy December 18,2024 .    LABS     Had labs at [facility] . No current labs per PCP     Med Refill     Needs refills  losartan 90 day supply to  Drug        Subjective       Yusuf Crawford is a 84 y.o. male initially seen October 2023 for cardiac consultation.  He has HTN, essential tremor, and hypercholesterolemia.  His symptoms included shortness of breath.  Symptoms worsen after having COVID.     On 11/6/2023 Lexiscan stress test showed increased BP response and ischemia versus versus diaphragmatic attenuation.  Cozaar changed to Hyzaar.  Plan to proceed with cardiac catheterization for definitive doses if symptoms persisted.  Echocardiogram showed borderline LVH with EF around 60% and no significant valve issues.    At last visit admitted to dizziness.  Diuretic discontinued. On 5/14/2024 carotid ultrasound showed mild to moderate plaque without decreased blood flow.    Today returns the office for follow-up visit.  He denies recent cardiac symptoms or concerns.  He anticipates cholecystectomy next month and removal of common bile duct stent removal prior.  Due to lower blood pressure Inderal was stopped.  He admits to episodes of dizziness mostly when for standing and ask about decreasing blood pressure medication.    Cardiac History:    Past Surgical History:   Procedure Laterality Date    CARDIOVASCULAR STRESS TEST  11/06/2023    Lexiscan. EF 58%. 202/92. PVC. R/O Inferoseptal Ischemia    ECHO - CONVERTED  11/06/2023    EF 60%. LA- 3.8. Mild MR. RVSP- 27 mmHg       Current Outpatient Medications   Medication Sig Dispense Refill    aspirin 81 MG EC tablet Take 1 tablet by mouth Daily. 30 tablet 6    Calcium Carbonate-Vit D-Min (CALCIUM 1200 PO) Take  by mouth.      clonazePAM (KlonoPIN) 0.5 MG tablet Take 1 tablet by mouth 2 (Two) Times a Day As Needed. 1/2 tablet twice daily       doxazosin (CARDURA) 4 MG tablet Take 1 tablet by mouth Every Night.      Ferrous Sulfate ER 45 MG tablet controlled-release Take 1 tablet by mouth Every Other Day.      multivitamin with minerals tablet tablet Take 1 tablet by mouth Daily.      omeprazole (priLOSEC) 20 MG capsule Take 1 capsule by mouth 2 (Two) Times a Day.      pravastatin (PRAVACHOL) 20 MG tablet Take 1 tablet by mouth Daily.      losartan (Cozaar) 25 MG tablet Take 1 tablet by mouth Daily. 90 tablet 1     No current facility-administered medications for this visit.       Patient has no known allergies.    Past Medical History:   Diagnosis Date    Colon cancer     History of elbow surgery     History of partial colectomy     2008    History of transurethral resection of prostate     Hyperlipidemia     Hypertension     Prostate cancer        Social History     Socioeconomic History    Marital status:    Tobacco Use    Smoking status: Never    Smokeless tobacco: Never   Vaping Use    Vaping status: Never Used   Substance and Sexual Activity    Alcohol use: Never    Drug use: Never       Family History   Problem Relation Age of Onset    Cancer Mother     Dementia Father     Heart attack Father         Multiple MI's in his 50's    Dementia Brother     Other Other         medical history unknown    Hypertension Daughter     Hypertension Daughter     Hypertension Son     Hypertension Son        Review of Systems   Constitutional: Negative for diaphoresis and fever.   Cardiovascular:  Negative for chest pain, leg swelling, near-syncope and palpitations.   Hematologic/Lymphatic: Negative for bleeding problem.   Gastrointestinal:  Positive for bloating, heartburn and nausea. Negative for melena and vomiting.   Neurological:  Positive for dizziness.        BP Readings from Last 5 Encounters:   11/14/24 126/82   05/01/24 118/60   10/24/23 140/80       Wt Readings from Last 5 Encounters:   05/01/24 66 kg (145 lb 6.4 oz)   11/06/23 71.7 kg (158 lb 1.1  "oz)   10/24/23 71.7 kg (158 lb)       Objective     /82 (BP Location: Left arm, Patient Position: Sitting, Cuff Size: Adult)   Pulse 74   Ht 177.8 cm (70\")   BMI 20.86 kg/m²     Vitals and nursing note reviewed.   Constitutional:       Appearance: Healthy appearance. Not in distress.   Eyes:      Conjunctiva/sclera: Conjunctivae normal.      Pupils: Pupils are equal, round, and reactive to light.   HENT:      Head: Normocephalic.   Pulmonary:      Effort: Pulmonary effort is normal.      Breath sounds: Normal breath sounds.   Cardiovascular:      PMI at left midclavicular line. Normal rate. Regular rhythm.   Edema:     Peripheral edema absent.   Abdominal:      General: Bowel sounds are normal.      Palpations: Abdomen is soft.   Musculoskeletal: Normal range of motion.      Cervical back: Normal range of motion and neck supple. Skin:     General: Skin is warm and dry.   Neurological:      Mental Status: Alert, oriented to person, place, and time and oriented to person, place and time.          Procedures: None today         Assessment & Plan   Diagnoses and all orders for this visit:    1. Primary hypertension (Primary)  -     losartan (Cozaar) 25 MG tablet; Take 1 tablet by mouth Daily.  Dispense: 90 tablet; Refill: 1    2. Hypercholesterolemia    3. Dizziness  -     losartan (Cozaar) 25 MG tablet; Take 1 tablet by mouth Daily.  Dispense: 90 tablet; Refill: 1    4. Abnormal nuclear stress test      HTN/dizziness  -BP stable.  According to patient has orthostatic hypotension at times.  -Decrease losartan to 25 mg daily  -On Cardura, followed by urology  -Continue to monitor blood pressure.  BP monitoring sheet provided.    Hypercholesterolemia  -Continue pravastatin  -Labs with PCP    Abnormal nuclear stress test  - 11/6/2023 Lexiscan stress test: Diaphragmatic attenuation versus small inferior septal ischemia.  -Echocardiogram 11/6/2023: Borderline LVH with a EF around 60%, mild MR, RVSP 27 mmHg  -Denies " anginal symptoms   -Continue statin, aspirin     Tremors  -Followed by neurologist      6-month follow-up visit scheduled.     Please call sooner for cardiac concerns.              Electronically signed by MERLINE Preciado,  November 14, 2024 17:38 EST    Dictated Utilizing Dragon Dictation: Part of this note may be an electronic transcription/translation of spoken language to printed text using the Dragon Dictation System.

## 2024-11-14 NOTE — TELEPHONE ENCOUNTER
Rx Refill Note  Requested Prescriptions     Pending Prescriptions Disp Refills    losartan (COZAAR) 50 MG tablet [Pharmacy Med Name: LOSARTAN POTASSIUM 50 MG TAB] 90 tablet 0     Sig: TAKE 1 TABLET BY MOUTH DAILY TO LOWER BLOOD PRESSURE      Last office visit with prescribing clinician: 5/1/2024   Last telemedicine visit with prescribing clinician: Visit date not found   Next office visit with prescribing clinician: 11/14/2024                         Would you like a call back once the refill request has been completed: [] Yes [] No    If the office needs to give you a call back, can they leave a voicemail: [] Yes [] No    Karie Ramirez CMA  11/14/24, 11:58 EST

## 2025-02-12 ENCOUNTER — TELEPHONE (OUTPATIENT)
Dept: CARDIOLOGY | Facility: CLINIC | Age: 85
End: 2025-02-12
Payer: MEDICARE

## 2025-02-12 NOTE — TELEPHONE ENCOUNTER
Received fax from Dr. Rohan Arreola for cardiac clearance for patient to have a T12m L4 kyphoplasty. Patient is on aspirin, unclear if needing to hold. According to our records, I do not see where patient has had any stenting.          Fax [phone]

## 2025-03-18 ENCOUNTER — CLINICAL SUPPORT (OUTPATIENT)
Dept: CARDIOLOGY | Facility: CLINIC | Age: 85
End: 2025-03-18
Payer: MEDICARE

## 2025-03-18 ENCOUNTER — TELEPHONE (OUTPATIENT)
Dept: CARDIOLOGY | Facility: CLINIC | Age: 85
End: 2025-03-18
Payer: MEDICARE

## 2025-03-18 DIAGNOSIS — R42 DIZZINESS: Primary | ICD-10-CM

## 2025-03-18 PROCEDURE — 93000 ELECTROCARDIOGRAM COMPLETE: CPT | Performed by: INTERNAL MEDICINE

## 2025-03-18 NOTE — TELEPHONE ENCOUNTER
Patient in office today with outside order for EKG form Dr. Paz to due surg. On Saturday 3/22/2025    EKG done and results to be faxed

## 2025-03-18 NOTE — PROGRESS NOTES
Procedure     ECG 12 Lead    Date/Time: 3/18/2025 6:09 PM  Performed by: Rossy Duff MD    Authorized by: Rossy Duff MD  Comparison: compared with previous ECG from 10/24/2023  Comparison to previous ECG: Heart rate better  Rhythm: sinus rhythm  Rate: normal  QRS axis: normal  Other findings: non-specific ST-T wave changes    Clinical impression: non-specific ECG